# Patient Record
Sex: FEMALE | Race: WHITE | NOT HISPANIC OR LATINO | Employment: FULL TIME | URBAN - METROPOLITAN AREA
[De-identification: names, ages, dates, MRNs, and addresses within clinical notes are randomized per-mention and may not be internally consistent; named-entity substitution may affect disease eponyms.]

---

## 2017-03-09 ENCOUNTER — LAB REQUISITION (OUTPATIENT)
Dept: LAB | Facility: HOSPITAL | Age: 22
End: 2017-03-09
Payer: COMMERCIAL

## 2017-03-09 ENCOUNTER — ALLSCRIPTS OFFICE VISIT (OUTPATIENT)
Dept: OTHER | Facility: OTHER | Age: 22
End: 2017-03-09

## 2017-03-09 DIAGNOSIS — Z01.419 ENCOUNTER FOR GYNECOLOGICAL EXAMINATION WITHOUT ABNORMAL FINDING: ICD-10-CM

## 2017-03-09 LAB
CLUE CELL (HISTORICAL): NORMAL
HYPHAL YEAST (HISTORICAL): NORMAL
KOH PREP (HISTORICAL): NORMAL
TRICHOMONAS (HISTORICAL): NORMAL

## 2017-03-09 PROCEDURE — G0145 SCR C/V CYTO,THINLAYER,RESCR: HCPCS | Performed by: NURSE PRACTITIONER

## 2017-03-15 LAB
LAB AP GYN PRIMARY INTERPRETATION: NORMAL
LAB AP LMP: NORMAL
Lab: NORMAL

## 2017-03-17 ENCOUNTER — GENERIC CONVERSION - ENCOUNTER (OUTPATIENT)
Dept: OTHER | Facility: OTHER | Age: 22
End: 2017-03-17

## 2017-08-02 ENCOUNTER — ALLSCRIPTS OFFICE VISIT (OUTPATIENT)
Dept: OTHER | Facility: OTHER | Age: 22
End: 2017-08-02

## 2017-08-08 ENCOUNTER — LAB CONVERSION - ENCOUNTER (OUTPATIENT)
Dept: OTHER | Facility: OTHER | Age: 22
End: 2017-08-08

## 2017-08-08 LAB
A. VAGINALIS BY PCR (HISTORICAL): NOT DETECTED
A.VAGINALIS LOG (CELL/ML) (HISTORICAL): <3.25
BVAB 2 (HISTORICAL): NOT DETECTED
C. ALBICANS, DNA OR PCR (HISTORICAL): NOT DETECTED
C. GLABRATA, DNA OR PCR (HISTORICAL): NOT DETECTED
C. PARAPSILOSIS, DNA OR PCR (HISTORICAL): NOT DETECTED
C. TROPICALIS, DNA OR PCR (HISTORICAL): NOT DETECTED
C.DUBLINIENSIS BY PCR OR DNA (HISTORICAL): NOT DETECTED
C.KRUSEI BY PCR OR DNA (HISTORICAL): NOT DETECTED
CANDIDA GENUS (HISTORICAL): DETECTED
GARDNERELLA BY MOL. METHOD (HISTORICAL): <3.25
GARDNERELLA BY MOL. METHOD (HISTORICAL): NOT DETECTED
LACTOBACILLUS (SPECIES) (HISTORICAL): ABNORMAL
LACTOBACILLUS (SPECIES) (HISTORICAL): DETECTED
MEGASPHAERA TYPE 1 (HISTORICAL): NOT DETECTED
TRICHOMONAS (HISTORICAL): NOT DETECTED

## 2018-01-10 NOTE — RESULT NOTES
Verified Results  Kelvin [facility] [address] [id number] 70:35UB Ling Graham     Test Name Result Flag Reference   CLUE CELL Few clue cells seen     TRICHOMONAS Neg     YEAST WITH HYPHAE Neg     KOH PREP Faint whiff         Plan  BV (bacterial vaginosis)    · MetroNIDAZOLE 0 75 % Vaginal Gel; Insert one applicatorful intravaginally at  bedtime x 5 nights  Encounter for gynecological examination with Papanicolaou smear of cervix    · Follow-up visit in 1 year Evaluation and Treatment  Follow-up  Status: Hold For -  Scheduling  Requested for: [id number]   · (1) THIN PREP PAP WITH IMAGING; Status: In Progress - Specimen/Data Collected;    Done: [id number]  Maturation index required? : No  : 02/18/2017  HPV? : Not Requested  Vaginal discharge    · [address]; Status:Complete;   Done: 36XZC8153 02:00PM

## 2018-01-12 VITALS
HEIGHT: 67 IN | BODY MASS INDEX: 23.31 KG/M2 | WEIGHT: 148.5 LBS | DIASTOLIC BLOOD PRESSURE: 70 MMHG | SYSTOLIC BLOOD PRESSURE: 118 MMHG

## 2018-01-12 NOTE — PROGRESS NOTES
Assessment    1  Encounter for gynecological examination with Papanicolaou smear of cervix   (V72 31,V76 2) (M93 906,D71 3)   2  BV (bacterial vaginosis) (616 10,041 9) (N76 0,B96 89)   3  Vaginal discharge (623 5) (N89 8)    Plan  BV (bacterial vaginosis)    · MetroNIDAZOLE 0 75 % Vaginal Gel; Insert one applicatorful intravaginally at  bedtime x 5 nights   Rx By: Elissa Mancia; Dispense: 5 Days ; #:1 X 70 GM Tube; Refill: 1; For: BV (bacterial vaginosis); LINDA = N; Print Rx  Encounter for gynecological examination with Papanicolaou smear of cervix    · (1) THIN PREP PAP WITH IMAGING; Status: In Progress - Specimen/Data  Collected,Retrospective Authorization;   Done: [id number]   Perform:[facility] In Office Collection; Order Comments:CERVICAL - ENDOCERVICAL ROUTINE PAP TEST ; Due:09Mar2018; Last Updated By:Lindsey Nazario; 3/9/2017 2:57:49 PM;Ordered;  For:Encounter for gynecological examination with Papanicolaou smear of cervix; Ordered By:Genevieve Hermoisllo; Maturation index required? : No  : 02/18/2017  HPV? : Not Requested   · Follow-up visit in 1 year Evaluation and Treatment  Follow-up  Status: Hold For -  Scheduling  Requested for: [id number]   Ordered; For: Encounter for gynecological examination with Papanicolaou smear of cervix; Ordered By: Elissa Mancia Performed:  Due: 78ITL3842  Vaginal discharge    · [address]; Status:Resulted - Requires Verification;   Done: 25XEI2522 02:00PM   Performed: In Office; YWR:[id number];NTXKEEM; Today; For:Vaginal discharge; Ordered By:Svitlana Hermosillo;    Discussion/Summary  healthy adult female Currently, she eats an adequate diet and has an adequate exercise regimen  the risks and benefits of cervical cancer screening were discussed Pap test was done today Breast cancer screening: self breast exam technique was taught, monthly self breast exam was advised and breast cancer screening is not indicated   Colorectal cancer screening: colorectal cancer screening is not indicated  Osteoporosis screening: bone mineral density testing is not indicated  Advice and education were given regarding nutrition, aerobic exercise, reproductive health and contraception  We did discuss BC options but the pt  prefers to use condoms for now  We also discussed her issue with vaginal discharge and odor and the results of today's WM  She is aware that she may be transitioning into or out of BV  She will try the use of MetroGel as directed  Chief Complaint  Patient presents today for her 1st yearly exam       History of Present Illness  HPI: The pt  relates that her periods are regular and without problems  She is currently using condoms for her BC method and is comfortable with this  She is also c/o slight discharge associated with an odor  She states that these sx  tend to come & go  GYN HM, Adult Female [facility]: The patient is being seen for a health maintenance and gynecology evaluation  The last health maintenance visit was 2 year(s) ago  General Health: The patient's health since the last visit is described as good  Lifestyle:  She consumes a diverse and healthy diet  She exercises regularly  She does not use tobacco    Reproductive health:  she reports no menstrual problems  Menstrual history: The cycles have been regular  The duration of her recent periods has been regular  she uses contraception  For contraception, she uses condoms  she is sexually active  Screening: cancer screening reviewed and current  Review of Systems  vaginal discharge and vaginal odor  Constitutional: No fever, no chills, feels well, no tiredness, no recent weight gain or loss  Cardiovascular: no complaints of slow or fast heart rate, no chest pain, no palpitations, no leg claudication or lower extremity edema  Respiratory: no complaints of shortness of breath, no wheezing, no dyspnea on exertion, no orthopnea or PND     Breasts: no complaints of breast pain, breast lump or nipple discharge  Gastrointestinal: no complaints of abdominal pain, no constipation, no nausea or diarrhea, no vomiting, no bloody stools  Genitourinary: vaginal discharge, but as noted in HPI, no dysuria, no pelvic pain, no incontinence, no dysmenorrhea and no unexplained vaginal bleeding  Integumentary: no complaints of skin rash or lesion, no itching or dry skin, no skin wounds  Neurological: no complaints of headache, no confusion, no numbness or tingling, no dizziness or fainting  Active Problems    1  Abnormal uterine bleeding (AUB) (626 9) (N93 9)   2  C  difficile diarrhea (008 45) (A04 7)   3  Irritable bowel syndrome with diarrhea (564 1) (K58 0)    Past Medical History    · History of Known health problems: none (V49 89) (Z78 9)    The active problems and past medical history were reviewed and updated today  Surgical History    · Denied: History Of Prior Surgery    The surgical history was reviewed and updated today  Family History  Family History    · Family history of cardiac disorder (V17 49) (Z82 49)   · Family history of diabetes mellitus (V18 0) (Z83 3)   · Family history of hypertension (V17 49) (Z82 49)    The family history was reviewed and updated today  Social History    · Denied: History of Alcohol use   · Denied: History of Drug use   · Never a smoker   · Single  The social history was reviewed and is unchanged  Current Meds   1  Multiple Vitamin TABS; TAKE 1 TABLET DAILY; Therapy: 78ASR5613 to Recorded   2  Probiotic Oral Capsule; USE AS DIRECTED; Therapy: 26SOO9745 to Recorded    Allergies    1  Amoxicillin TABS    Vitals   Recorded: 01VNL7521 63:83SA   Systolic 665   Diastolic 70   Height 5 ft 7 in   Weight 148 lb 8 0 oz   BMI Calculated 23 26   BSA Calculated 1 78   LMP 18-Feb-2017     Physical Exam    Constitutional   General appearance: No acute distress, well appearing and well nourished      Neck   Neck: Normal, supple, trachea midline, no masses  Thyroid: Normal, no thyromegaly  Pulmonary   Respiratory effort: No increased work of breathing or signs of respiratory distress  Auscultation of lungs: Clear to auscultation  Cardiovascular   Auscultation of heart: Normal rate and rhythm, normal S1 and S2, no murmurs  Genitourinary   External genitalia: Normal and no lesions appreciated  Vagina: Abnormal   Small amount of tannish discharge noted  Urethra: Normal     Urethral meatus: Normal     Bladder: Normal, soft, non-tender and no prolapse or masses appreciated  Cervix: Normal, no palpable masses  A Pap smear was performed  Denies need for STD testing today  Uterus: Normal, non-tender, not enlarged, and no palpable masses  Adnexa/parametria: Normal, non-tender and no fullness or masses appreciated  Chest   Breasts: Normal and no dimpling or skin changes noted  Abdomen   Abdomen: Normal, non-tender, and no organomegaly noted  Liver and spleen: No hepatomegaly or splenomegaly  Examination for hernias: No hernias appreciated  Lymphatic   Palpation of lymph nodes in neck, axillae, groin and/or other locations: No lymphadenopathy or masses noted  Skin   Skin and subcutaneous tissue: Normal skin turgor and no rashes      Psychiatric   Orientation to person, place, and time: Normal     Mood and affect: Normal        Signatures   Electronically signed by : Lei Polo; Mar  9 2017  4:22PM EST                       (Author)    Electronically signed by : Mark Calderon DO; Mar 10 2017  8:33AM EST

## 2018-01-13 NOTE — RESULT NOTES
Verified Results  (1) THIN PREP PAP WITH IMAGING [id number] 33:12PP Shaun Cancino Order Number: [id number]     Test Name Result Flag Reference   LAB AP CASE REPORT (Report)     Gynecologic Cytology Report            Case: [id number]                  Authorizing Provider: LAUREN Vargas    Collected:      03/09/2017 1457        First Screen:     MAURICIO Jorge Received:      03/13/2017 0920        Specimen:  LIQUID-BASED PAP, SCREENING, Cervix   LAB AP GYN PRIMARY INTERPRETATION      Negative for intraepithelial lesion or malignancy  Electronically signed by MAURICIO Jorge on 3/15/2017 at 3:23 PM   LAB AP GYN SPECIMEN ADEQUACY      Satisfactory for evaluation  Endocervical/transformation zone component present  LAB AP GYN ADDITIONAL INFORMATION (Report)     [employer] FDA approved ,  and ThinPrep Imaging System are   utilized with strict adherence to the 's instruction manual to   prepare gynecologic and non-gynecologic cytology specimens for the   production of ThinPrep slides as well as for gynecologic ThinPrep imaging  These processes have been validated by our laboratory and/or by the     The Pap test is not a diagnostic procedure and should not be used as the   sole means to detect cervical cancer  It is only a screening procedure to   aid in the detection of cervical cancer and its precursors  Both   false-negative and false-positive results have been experienced  Your   patient's test result should be interpreted in this context together with   the history and clinical findings  LAB AP LMP 2/18/2017     Performing Comments: CERVICAL - ENDOCERVICAL  ROUTINE PAP TEST

## 2018-01-14 VITALS — DIASTOLIC BLOOD PRESSURE: 62 MMHG | BODY MASS INDEX: 23.26 KG/M2 | SYSTOLIC BLOOD PRESSURE: 126 MMHG | WEIGHT: 148.5 LBS

## 2019-07-15 ENCOUNTER — ANNUAL EXAM (OUTPATIENT)
Dept: GYNECOLOGY | Facility: CLINIC | Age: 24
End: 2019-07-15
Payer: COMMERCIAL

## 2019-07-15 VITALS
HEART RATE: 69 BPM | DIASTOLIC BLOOD PRESSURE: 78 MMHG | WEIGHT: 148.2 LBS | HEIGHT: 67 IN | SYSTOLIC BLOOD PRESSURE: 140 MMHG | BODY MASS INDEX: 23.26 KG/M2

## 2019-07-15 DIAGNOSIS — N76.2 ACUTE VULVITIS: ICD-10-CM

## 2019-07-15 DIAGNOSIS — Z01.411 ENCOUNTER FOR GYNECOLOGICAL EXAMINATION (GENERAL) (ROUTINE) WITH ABNORMAL FINDINGS: Primary | ICD-10-CM

## 2019-07-15 DIAGNOSIS — Z23 NEED FOR PROPHYLACTIC VACCINATION AGAINST HUMAN PAPILLOMAVIRUS: ICD-10-CM

## 2019-07-15 PROCEDURE — 90471 IMMUNIZATION ADMIN: CPT | Performed by: OBSTETRICS & GYNECOLOGY

## 2019-07-15 PROCEDURE — S0610 ANNUAL GYNECOLOGICAL EXAMINA: HCPCS | Performed by: OBSTETRICS & GYNECOLOGY

## 2019-07-15 PROCEDURE — 90651 9VHPV VACCINE 2/3 DOSE IM: CPT | Performed by: OBSTETRICS & GYNECOLOGY

## 2019-07-15 NOTE — PROGRESS NOTES
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   Advised to use hydrocortisone 1% cream for irritation and A&D ointment to protect the skin before rididng  Heart murmur - will follow up with PCP  All contraception methods reviewed  Patient will discuss with her boyfriend and call our office  Patient advised to start PNV daily and use condoms 100% of the time if she does not wish to conceive  Recommended monthly SBE and annual CBE  ASCCP guidelines reviewed and no pap collected today  The patient declines STI testing at this time  Return to the office in one year for routine annual gyn exam or sooner PRN  Diagnoses and all orders for this visit:    Encounter for gynecological examination (general) (routine) with abnormal findings    Acute vulvitis        Subjective:      Patient ID: Shakeel Looney is a 25 y o  female  This patient presents for routine annual gyn exam    She denies acute gyn complaints  Menses are light and regular, she is currently menstruating  She is not using contraception and reports she is not using condoms 100%  She is unsure if she wants to start a contraceptive method at this time  She is in a monogamous relationship for 3 years and states that although they will probably get  soon, she does not wish to start a family for about 5 years  She denies breast concerns, abn discharge, pelvic pain, bowel/bladder dysfunction  Denies STI concerns  She had the first Gardasil injection and does wish to complete the series  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[employer] [facility] and living with her sister  The following portions of the patient's history were reviewed and updated as appropriate: allergies, current medications, past family history, past medical history, past social history, past surgical history and problem list     Review of Systems   Constitutional: Negative  HENT: Negative      Respiratory: Negative  Cardiovascular: Negative  Gastrointestinal: Negative  Endocrine: Negative  Genitourinary: Negative for difficulty urinating, dyspareunia, dysuria, frequency, menstrual problem, pelvic pain, urgency, vaginal bleeding, vaginal discharge and vaginal pain  Musculoskeletal: Negative  Skin: Negative  Neurological: Negative  Psychiatric/Behavioral: Negative  Objective:      /78 (BP Location: Left arm)   Pulse 69   Ht 5' 7" (1 702 m)   Wt 67 2 kg (148 lb 3 2 oz)   LMP 07/11/2019   BMI 23 21 kg/m²          Physical Exam   Constitutional: She is oriented to person, place, and time  She appears well-developed and well-nourished  She is cooperative  HENT:   Head: Normocephalic and atraumatic  Neck: Normal range of motion  Neck supple  No thyroid mass and no thyromegaly present  Cardiovascular: Normal rate and regular rhythm  Murmur heard  Pulmonary/Chest: Effort normal and breath sounds normal  Right breast exhibits no inverted nipple, no mass, no nipple discharge, no skin change and no tenderness  Left breast exhibits no inverted nipple, no mass, no nipple discharge, no skin change and no tenderness  No breast tenderness or discharge  Breasts are symmetrical    Abdominal: Soft  Normal appearance and bowel sounds are normal  There is no hepatosplenomegaly  There is no tenderness  Hernia confirmed negative in the right inguinal area and confirmed negative in the left inguinal area  Genitourinary: Uterus normal  Rectal exam shows no external hemorrhoid  No breast tenderness or discharge  Pelvic exam was performed with patient supine  No labial fusion  There is rash (mild erythema B/L) on the right labia  There is no tenderness, lesion or injury on the right labia  There is rash (mild erythema B/L) on the left labia  There is no tenderness, lesion or injury on the left labia  Uterus is not deviated, not enlarged, not fixed and not tender   Cervix exhibits no motion tenderness, no discharge and no friability  Right adnexum displays no mass, no tenderness and no fullness  Left adnexum displays no mass, no tenderness and no fullness  No erythema, tenderness or bleeding (patient is currently menstruating) in the vagina  No signs of injury around the vagina  No vaginal discharge found  Genitourinary Comments: Urethra normal without lesions  No bladder tenderness   Musculoskeletal: Normal range of motion  Lymphadenopathy:     She has no axillary adenopathy  No inguinal adenopathy noted on the right or left side  Right: No inguinal adenopathy present  Left: No inguinal adenopathy present  Neurological: She is alert and oriented to person, place, and time  Skin: Skin is warm, dry and intact  Psychiatric: She has a normal mood and affect  Her speech is normal and behavior is normal  Cognition and memory are normal    Nursing note and vitals reviewed

## 2019-07-15 NOTE — PATIENT INSTRUCTIONS
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

## 2020-07-27 NOTE — PROGRESS NOTES
Assessment/Plan:     We discussed all options at length including barrier methods, combination estrogen progesterone methods (pill, patch and ring), progesterone only methods (pill, Depo, Nexplanon and IUD)  She is interested in [employer]  We reviewed directions for use, SEs/AEs, risks and benefits  All questions were answered  The patient's personal and FH were reviewed and she is without risk factors for VTE  Patient will follow with PCP for heart murmur  Recommended monthly SBE and annual CBE  ASCCP guidelines reviewed and pap collected today  The patient declines STI testing at this time  She is aware that condoms are recommended with all sexual contact for STI prevention    Return to the office in one year for routine annual gyn exam or sooner PRN  Diagnoses and all orders for this visit:    Encounter for gynecological examination (general) (routine) without abnormal findings    Encounter for gynecological examination with Papanicolaou smear of cervix  -     Liquid-based pap, screening    Encounter for initial prescription of vaginal ring hormonal contraceptive  -     etonogestrel-ethinyl estradiol (NUVARING) 0 12-0 015 MG/24HR vaginal ring; Insert vaginally and leave in place for 3 consecutive weeks, then remove for 1 week  Subjective:      Patient ID: Parmjit Khan is a 22 y o  female  This patient presents for routine annual gyn exam    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  She was advised on the use of hydrocortisone cream and A&D ointment at last visit  These sx have not been bothersome  A heart murmur was noted at last visit, with finishing school and starting new job, she has not been able   [facility] options reviewed at last visit and patient was advised to start PNV  She and her fiance are getting  in August and she is interested in [facility]  Menses are light and regular  She denies breast concerns, abn discharge, pelvic pain, bowel/bladder dysfunction, depression/anxiety  Monogamous relationship, they are not sexually active  Denies STI concerns  Last pap 2017, due today  The following portions of the patient's history were reviewed and updated as appropriate: allergies, current medications, past family history, past medical history, past social history, past surgical history and problem list     Review of Systems   Constitutional: Negative  HENT: Negative  Respiratory: Negative  Cardiovascular: Negative  Gastrointestinal: Negative  Endocrine: Negative  Genitourinary: Negative for difficulty urinating, dyspareunia, dysuria, frequency, menstrual problem, pelvic pain, urgency, vaginal bleeding, vaginal discharge and vaginal pain  Musculoskeletal: Negative  Skin: Negative  Neurological: Negative  Psychiatric/Behavioral: Negative  Objective:      /76 (BP Location: Left arm, Patient Position: Sitting, Cuff Size: Standard)   Pulse 92   Ht 5' 7" (1 702 m)   Wt 64 9 kg (143 lb)   LMP 07/21/2020   BMI 22 40 kg/m²          Physical Exam   Constitutional: She is oriented to person, place, and time  She appears well-developed and well-nourished  She is cooperative  HENT:   Head: Normocephalic and atraumatic  Neck: Normal range of motion  Neck supple  No thyroid mass and no thyromegaly present  Cardiovascular: Normal rate and regular rhythm  Murmur heard  Pulmonary/Chest: Effort normal and breath sounds normal  Right breast exhibits no inverted nipple, no mass, no nipple discharge, no skin change and no tenderness  Left breast exhibits no inverted nipple, no mass, no nipple discharge, no skin change and no tenderness  No breast tenderness or discharge  Breasts are symmetrical    Abdominal: Soft  Normal appearance and bowel sounds are normal  There is no hepatosplenomegaly  There is no tenderness  Hernia confirmed negative in the right inguinal area and confirmed negative in the left inguinal area     Genitourinary: Vagina normal and uterus normal  Rectal exam shows no external hemorrhoid  No breast tenderness or discharge  Pelvic exam was performed with patient supine  No labial fusion  There is no rash, tenderness, lesion or injury on the right labia  There is no rash, tenderness, lesion or injury on the left labia  Uterus is not deviated, not enlarged, not fixed and not tender  Cervix exhibits no motion tenderness, no discharge and no friability  Right adnexum displays no mass, no tenderness and no fullness  Left adnexum displays no mass, no tenderness and no fullness  No erythema, tenderness or bleeding in the vagina  No signs of injury around the vagina  No vaginal discharge found  Genitourinary Comments: Urethra normal without lesions  No bladder tenderness   Musculoskeletal: Normal range of motion  Lymphadenopathy:     She has no axillary adenopathy  No inguinal adenopathy noted on the right or left side  Right: No inguinal adenopathy present  Left: No inguinal adenopathy present  Neurological: She is alert and oriented to person, place, and time  Skin: Skin is warm, dry and intact  Psychiatric: She has a normal mood and affect  Her speech is normal and behavior is normal  Cognition and memory are normal    Nursing note and vitals reviewed

## 2020-07-28 ENCOUNTER — ANNUAL EXAM (OUTPATIENT)
Dept: GYNECOLOGY | Facility: CLINIC | Age: 25
End: 2020-07-28
Payer: COMMERCIAL

## 2020-07-28 VITALS
SYSTOLIC BLOOD PRESSURE: 120 MMHG | HEIGHT: 67 IN | BODY MASS INDEX: 22.44 KG/M2 | HEART RATE: 92 BPM | DIASTOLIC BLOOD PRESSURE: 76 MMHG | WEIGHT: 143 LBS

## 2020-07-28 DIAGNOSIS — Z01.419 ENCOUNTER FOR GYNECOLOGICAL EXAMINATION (GENERAL) (ROUTINE) WITHOUT ABNORMAL FINDINGS: Primary | ICD-10-CM

## 2020-07-28 DIAGNOSIS — Z30.015 ENCOUNTER FOR INITIAL PRESCRIPTION OF VAGINAL RING HORMONAL CONTRACEPTIVE: ICD-10-CM

## 2020-07-28 DIAGNOSIS — Z01.419 ENCOUNTER FOR GYNECOLOGICAL EXAMINATION WITH PAPANICOLAOU SMEAR OF CERVIX: ICD-10-CM

## 2020-07-28 PROCEDURE — G0145 SCR C/V CYTO,THINLAYER,RESCR: HCPCS | Performed by: OBSTETRICS & GYNECOLOGY

## 2020-07-28 PROCEDURE — 87624 HPV HI-RISK TYP POOLED RSLT: CPT | Performed by: OBSTETRICS & GYNECOLOGY

## 2020-07-28 PROCEDURE — 99395 PREV VISIT EST AGE 18-39: CPT | Performed by: OBSTETRICS & GYNECOLOGY

## 2020-07-28 RX ORDER — MULTIVITAMIN
1 TABLET ORAL DAILY
COMMUNITY
Start: 2015-11-27

## 2020-07-28 RX ORDER — ETONOGESTREL AND ETHINYL ESTRADIOL 11.7; 2.7 MG/1; MG/1
INSERT, EXTENDED RELEASE VAGINAL
Qty: 1 EACH | Refills: 6 | Status: SHIPPED | OUTPATIENT
Start: 2020-07-28 | End: 2021-02-24

## 2020-07-28 NOTE — PATIENT INSTRUCTIONS
We discussed all options at length including barrier methods, combination estrogen progesterone methods (pill, patch and ring), progesterone only methods (pill, Depo, Nexplanon and IUD)  She is interested in [employer]  We reviewed directions for use, SEs/AEs, risks and benefits  All questions were answered  The patient's personal and FH were reviewed and she is without risk factors for VTE  Patient will follow with PCP for heart murmur  Recommended monthly SBE and annual CBE  ASCCP guidelines reviewed and pap collected today  The patient declines STI testing at this time  She is aware that condoms are recommended with all sexual contact for STI prevention    Return to the office in one year for routine annual gyn exam or sooner PRN

## 2020-07-30 LAB
HPV HR 12 DNA CVX QL NAA+PROBE: NEGATIVE
HPV16 DNA CVX QL NAA+PROBE: NEGATIVE
HPV18 DNA CVX QL NAA+PROBE: NEGATIVE

## 2020-08-06 LAB
LAB AP GYN PRIMARY INTERPRETATION: NORMAL
Lab: NORMAL

## 2021-02-24 DIAGNOSIS — Z30.015 ENCOUNTER FOR INITIAL PRESCRIPTION OF VAGINAL RING HORMONAL CONTRACEPTIVE: ICD-10-CM

## 2021-02-24 RX ORDER — ETONOGESTREL AND ETHINYL ESTRADIOL .12; .015 MG/D; MG/D
RING VAGINAL
Qty: 1 EACH | Refills: 4 | Status: SHIPPED | OUTPATIENT
Start: 2021-02-24 | End: 2021-07-12

## 2021-07-13 ENCOUNTER — TELEPHONE (OUTPATIENT)
Dept: GYNECOLOGY | Facility: CLINIC | Age: 26
End: 2021-07-13

## 2021-07-13 NOTE — TELEPHONE ENCOUNTER
----- Message from [id number]  [facility] sent at 7/12/2021  7:59 AM EDT -----  Regarding: needs appt  I sent in a refill   Patient is due at the end of this month, not sure if she's going to continue with us as she works at [employer]

## 2021-07-13 NOTE — TELEPHONE ENCOUNTER
Called and LM, per Madelin, told her refill was sent and also stated patient is due for AE at end of July  Told her to call back to discuss whether she will continue at our practice considering she has moved